# Patient Record
Sex: FEMALE | Race: ASIAN | NOT HISPANIC OR LATINO | Employment: FULL TIME | URBAN - METROPOLITAN AREA
[De-identification: names, ages, dates, MRNs, and addresses within clinical notes are randomized per-mention and may not be internally consistent; named-entity substitution may affect disease eponyms.]

---

## 2018-09-02 ENCOUNTER — OFFICE VISIT (OUTPATIENT)
Dept: URGENT CARE | Facility: CLINIC | Age: 38
End: 2018-09-02
Payer: COMMERCIAL

## 2018-09-02 VITALS
DIASTOLIC BLOOD PRESSURE: 70 MMHG | SYSTOLIC BLOOD PRESSURE: 110 MMHG | RESPIRATION RATE: 16 BRPM | HEART RATE: 90 BPM | BODY MASS INDEX: 21.33 KG/M2 | WEIGHT: 120.4 LBS | OXYGEN SATURATION: 100 % | TEMPERATURE: 98.6 F | HEIGHT: 63 IN

## 2018-09-02 DIAGNOSIS — R19.7 DIARRHEA, UNSPECIFIED TYPE: ICD-10-CM

## 2018-09-02 DIAGNOSIS — K52.9 NONINFECTIOUS GASTROENTERITIS, UNSPECIFIED TYPE: Primary | ICD-10-CM

## 2018-09-02 DIAGNOSIS — R11.2 NAUSEA AND VOMITING, INTRACTABILITY OF VOMITING NOT SPECIFIED, UNSPECIFIED VOMITING TYPE: ICD-10-CM

## 2018-09-02 PROCEDURE — 99213 OFFICE O/P EST LOW 20 MIN: CPT | Performed by: NURSE PRACTITIONER

## 2018-09-02 RX ORDER — ONDANSETRON 4 MG/1
4 TABLET, FILM COATED ORAL EVERY 8 HOURS PRN
Qty: 20 TABLET | Refills: 0 | Status: SHIPPED | OUTPATIENT
Start: 2018-09-02

## 2018-09-02 RX ORDER — LORAZEPAM 0.5 MG/1
0.5 TABLET ORAL EVERY 8 HOURS PRN
COMMUNITY

## 2018-09-02 NOTE — PATIENT INSTRUCTIONS
Stay hydrated  Fluids  Gatorade, powerade, smart water, etc    BRAT diet (bananas, rice, applesauce, tea, toast)  Zofran as needed for nausea and vomiting  If symptoms persist or worsen, follow up with your primary care provider

## 2018-09-02 NOTE — PROGRESS NOTES
Assessment/Plan:     1  Noninfectious gastroenteritis, unspecified type  Viral vs food poisoning  Fluids/electrolyte replacement  Instructed on BRAT diet once n/v controlled  - ondansetron (ZOFRAN) 4 mg tablet; Take 1 tablet (4 mg total) by mouth every 8 (eight) hours as needed for nausea or vomiting  Dispense: 20 tablet; Refill: 0    2  Nausea and vomiting, intractability of vomiting not specified, unspecified vomiting type  - ondansetron (ZOFRAN) 4 mg tablet; Take 1 tablet (4 mg total) by mouth every 8 (eight) hours as needed for nausea or vomiting  Dispense: 20 tablet; Refill: 0    3  Diarrhea, unspecified type  Maintain hydration  Clear liquids and then advance diet as tolerated, BRAT diet initially  Follow up with pcp if diarrhea persists  Subjective:     Patient ID: Mckayla Sprague is a 45 y o  female who presents for n/v/d    thinks has food poisoning  Ate a protein box from [employer]  No sick contacts  Symptoms started 2-3 days  N/v/d  Unable to keep anything down  Ate soup last night and vomited 8pm  No vomiting since  Diarrhea last episode just before arrival  Several times a day  Able to tolerate sips of water  Achey        Review of Systems   Constitutional: Positive for chills  Negative for fever  Gastrointestinal: Positive for diarrhea, nausea and vomiting  Negative for abdominal pain  Musculoskeletal: Positive for myalgias ("hurts all over")  Skin: Negative for pallor  Neurological: Negative for dizziness  Objective:     Physical Exam   Constitutional: She is oriented to person, place, and time  She appears well-developed and well-nourished  No distress  HENT:   Oral mucosa moist   Cardiovascular: Normal rate  Pulmonary/Chest: Effort normal    Abdominal: Soft  Bowel sounds are normal  She exhibits no distension  There is no tenderness  Neurological: She is alert and oriented to person, place, and time  Skin: Skin is warm and dry  No rash noted     Vitals reviewed

## 2023-09-11 ENCOUNTER — OFFICE VISIT (OUTPATIENT)
Dept: URGENT CARE | Facility: CLINIC | Age: 43
End: 2023-09-11
Payer: COMMERCIAL

## 2023-09-11 ENCOUNTER — APPOINTMENT (OUTPATIENT)
Dept: RADIOLOGY | Facility: CLINIC | Age: 43
End: 2023-09-11
Attending: FAMILY MEDICINE
Payer: COMMERCIAL

## 2023-09-11 VITALS
SYSTOLIC BLOOD PRESSURE: 114 MMHG | HEIGHT: 62 IN | RESPIRATION RATE: 18 BRPM | BODY MASS INDEX: 24.93 KG/M2 | OXYGEN SATURATION: 99 % | HEART RATE: 73 BPM | DIASTOLIC BLOOD PRESSURE: 70 MMHG | WEIGHT: 135.5 LBS | TEMPERATURE: 97.8 F

## 2023-09-11 DIAGNOSIS — R07.81 RIB PAIN ON RIGHT SIDE: ICD-10-CM

## 2023-09-11 DIAGNOSIS — S20.211A RIB CONTUSION, RIGHT, INITIAL ENCOUNTER: Primary | ICD-10-CM

## 2023-09-11 PROCEDURE — 99213 OFFICE O/P EST LOW 20 MIN: CPT | Performed by: FAMILY MEDICINE

## 2023-09-11 PROCEDURE — 71101 X-RAY EXAM UNILAT RIBS/CHEST: CPT

## 2023-09-11 RX ORDER — BUPROPION HYDROCHLORIDE 150 MG/1
TABLET ORAL
COMMUNITY
Start: 2023-08-14

## 2023-09-11 RX ORDER — NAPROXEN 500 MG/1
500 TABLET ORAL EVERY 12 HOURS PRN
Qty: 30 TABLET | Refills: 0 | Status: SHIPPED | OUTPATIENT
Start: 2023-09-11

## 2023-09-11 RX ORDER — ESCITALOPRAM OXALATE 10 MG/1
TABLET ORAL
COMMUNITY
Start: 2023-08-14

## 2023-09-13 NOTE — PATIENT INSTRUCTIONS
Xray of ribs shows no fractures. Patient is to rest and apply a heating pad to the site. Naproxen prescribed to help w/ pain, use as directed, side effects discussed, take w/ food and water, may alternate w/ Tylenol as needed. May use a muscle rub such as Bengay or Icy Hot or topical Lidoderm patches (available OTC) as needed. Follow up w/ PCP office for re-check in 3-5 days. If symptoms persist despite treatment, worsen, or any new symptoms present, patient is to be seen in the ER.

## 2023-09-13 NOTE — PROGRESS NOTES
[facility]        NAME: Dirk Singh is a 37 y.o. female  : 1980    MRN: [id number]  DATE: 2023  TIME: 4:43 PM    Assessment and Plan   Rib contusion, right, initial encounter [Z24.120W]  0. Rib contusion, right, initial encounter  XR ribs right w pa chest min 3 views    naproxen (NAPROSYN) 500 mg tablet            Patient Instructions     Patient Instructions   Xray of ribs shows no fractures. Patient is to rest and apply a heating pad to the site. Naproxen prescribed to help w/ pain, use as directed, side effects discussed, take w/ food and water, may alternate w/ Tylenol as needed. May use a muscle rub such as Bengay or Icy Hot or topical Lidoderm patches (available OTC) as needed. Follow up w/ PCP office for re-check in 3-5 days. If symptoms persist despite treatment, worsen, or any new symptoms present, patient is to be seen in the ER. Follow up with PCP in 3-5 days. Proceed to  ER if symptoms worsen. Chief Complaint     Chief Complaint   Patient presents with   • Rib Pain     Pt here for right side rib pain x 1 week. Pt used Advil. History of Present Illness       36 yo female presents c/o R sided rib pain x 1 week. She states she fell while playing soccer about 1 week ago and thinks the pain is related to that. She denies any swelling or bruising of the area. No wounds or bleeding. No neck or back pain. No chest pain or SOB. She experiences pain w/ palpation of the area, deep breathing, and twisting motions of the upper body. She has been taking Ibuprofen as needed for pain. She has no known allergies. She denies any chance of pregnancy. She is concerned she may have a rib fracture. Review of Systems   Review of Systems   Constitutional: Negative. Respiratory: Negative. Cardiovascular: Negative. Gastrointestinal: Negative. Musculoskeletal:        As noted in HPI   Skin: Negative. Allergic/Immunologic: Negative.     Neurological: Negative. Hematological: Negative. Current Medications       Current Outpatient Medications:   •  buPROPion (WELLBUTRIN XL) 150 mg 24 hr tablet, , Disp: , Rfl:   •  escitalopram (LEXAPRO) 10 mg tablet, , Disp: , Rfl:   •  LORazepam (ATIVAN) 0.5 mg tablet, Take 0.5 mg by mouth every 8 (eight) hours as needed for anxiety, Disp: , Rfl:   •  naproxen (NAPROSYN) 500 mg tablet, Take 1 tablet (500 mg total) by mouth every 12 (twelve) hours as needed for mild pain, Disp: 30 tablet, Rfl: 0    Current Allergies     Allergies as of 2023   • (No Known Allergies)            The following portions of the patient's history were reviewed and updated as appropriate: allergies, current medications, past family history, past medical history, past social history, past surgical history and problem list.     Past Medical History:   Diagnosis Date   • Anxiety    • Depression        Past Surgical History:   Procedure Laterality Date   •  SECTION     • TONSILLECTOMY         Family History   Problem Relation Age of Onset   • No Known Problems Mother    • No Known Problems Father          Medications have been verified. Objective   /70   Pulse 73   Temp 97.8 °F (36.6 °C)   Resp 18   Ht 5' 2" (1.575 m)   Wt 61.5 kg (135 lb 8 oz)   SpO2 99%   BMI 24.78 kg/m²   No LMP recorded. Physical Exam     Physical Exam  Vitals and nursing note reviewed. Constitutional:       General: She is awake. She is not in acute distress. Appearance: Normal appearance. She is well-developed and well-groomed. She is not ill-appearing, toxic-appearing or diaphoretic. Neck:      Trachea: Trachea normal.   Cardiovascular:      Rate and Rhythm: Normal rate and regular rhythm. Pulses: Normal pulses. Heart sounds: Normal heart sounds. Pulmonary:      Effort: Pulmonary effort is normal. No tachypnea, accessory muscle usage or respiratory distress. Breath sounds: Normal breath sounds and air entry. Abdominal:      General: Abdomen is flat. There is no distension. Palpations: Abdomen is soft. Tenderness: There is no abdominal tenderness. There is no right CVA tenderness or left CVA tenderness. Musculoskeletal:      Cervical back: Normal range of motion and neck supple. No edema, erythema, signs of trauma, rigidity or tenderness. No pain with movement, spinous process tenderness or muscular tenderness. Normal range of motion. Comments: Back/Ribs/Chest wall: no swelling, erythema, bruising, rashes, or wounds. Skin is appropriately warm and intact. There is mild to moderate generalized tenderness to palpation along the right sided lateral rib region and the right anterior ribs cage. No step offs palpated. No spinal or paraspinal tenderness. Patient experiences discomfort w/ twisting motions of the upper body. Skin:     General: Skin is warm and dry. Capillary Refill: Capillary refill takes less than 2 seconds. Coloration: Skin is not pale. Findings: No abrasion, bruising, ecchymosis, erythema, rash or wound. Neurological:      Mental Status: She is alert and oriented to person, place, and time. Mental status is at baseline. Psychiatric:         Mood and Affect: Mood normal.         Behavior: Behavior normal. Behavior is cooperative. Thought Content:  Thought content normal.         Judgment: Judgment normal.

## 2025-02-13 ENCOUNTER — OFFICE VISIT (OUTPATIENT)
Dept: URGENT CARE | Facility: CLINIC | Age: 45
End: 2025-02-13
Payer: COMMERCIAL

## 2025-02-13 VITALS
SYSTOLIC BLOOD PRESSURE: 122 MMHG | WEIGHT: 140.8 LBS | HEART RATE: 72 BPM | RESPIRATION RATE: 20 BRPM | BODY MASS INDEX: 25.75 KG/M2 | TEMPERATURE: 98.1 F | DIASTOLIC BLOOD PRESSURE: 78 MMHG | OXYGEN SATURATION: 100 %

## 2025-02-13 DIAGNOSIS — R05.1 ACUTE COUGH: Primary | ICD-10-CM

## 2025-02-13 PROCEDURE — 99213 OFFICE O/P EST LOW 20 MIN: CPT | Performed by: PHYSICIAN ASSISTANT

## 2025-02-13 RX ORDER — ALBUTEROL SULFATE 90 UG/1
2 INHALANT RESPIRATORY (INHALATION) EVERY 6 HOURS PRN
Qty: 6.7 G | Refills: 0 | Status: SHIPPED | OUTPATIENT
Start: 2025-02-13

## 2025-02-13 RX ORDER — ONDANSETRON 4 MG/1
TABLET, FILM COATED ORAL
COMMUNITY
Start: 2024-12-04

## 2025-02-13 RX ORDER — DEXTROAMPHETAMINE SACCHARATE, AMPHETAMINE ASPARTATE, DEXTROAMPHETAMINE SULFATE AND AMPHETAMINE SULFATE 2.5; 2.5; 2.5; 2.5 MG/1; MG/1; MG/1; MG/1
TABLET ORAL
COMMUNITY
Start: 2025-01-27

## 2025-02-13 RX ORDER — BENZONATATE 100 MG/1
100 CAPSULE ORAL 3 TIMES DAILY PRN
Qty: 20 CAPSULE | Refills: 0 | Status: SHIPPED | OUTPATIENT
Start: 2025-02-13

## 2025-02-13 RX ORDER — OSELTAMIVIR PHOSPHATE 75 MG/1
CAPSULE ORAL
COMMUNITY
Start: 2025-02-10

## 2025-02-13 NOTE — PROGRESS NOTES
[facility]        NAME: Emma Barajas is a 44 y.o. female  : 1980    MRN: [id number]  DATE: 2025  TIME: 10:35 AM    Assessment and Plan   Acute cough [R05.1]  1. Acute cough  benzonatate (TESSALON PERLES) 100 mg capsule    albuterol (Proventil HFA) 90 mcg/act inhaler            Patient Instructions   Acute Upper Respiratory Tract Infection: Will send in tessalon perles and albuterol inhaler to be used as needed. We discussed likely influenza based on clinical hx.   -Based on the patients hx and clinical presentation they are likely suffering from a Viral illness. No sign of bacterial infection at this time.   -Stay very well hydrated and push fluids, gatorade, pedialyte or electrolyte drinks can be beneficial. Staying hydrated is very important.   -Run a humidifier by your bed and take steamy showers to clear mucus   -Zicam nasal AllClear can be soothing to the nasal passages   -You can use flonase once daily for two weeks   -Advil or Tylenol for fever or pain.  -Diffusing aromatherapy oils, such as peppermint and eucalyptus can be soothing and help with congestion.   -Mucinex OTC or Zyrtec or Claritin. Drink plenty of water with the mucinex.   -Honey or throat lozenges for cough may be helpful  -Warm salt water gargles and tea with honey. Warm soups and teas can be soothing.   -Sleep with a few pillows propping you up at night to aid with coughing and congestion.   -Obtain a pulse ox device and check your O2 1-2 times a day. You want your oxygen levels to be >93%. If they go below 93% go to the ED immediately.   -Vitamin C 500mg, Vitamin D 2000IU daily. You can attempt to use zinc or Zicam up to 30mg per day.  Echinacea and elderberry may also aid with fighting viral infections.   -If your sx worsen or persist follow up with your PCP for recheck. Red flag signs and ED precautions discussed.         Follow up with PCP in 3-5 days.  Proceed to  ER if symptoms worsen.    If tests have  been performed at [facility], our office will contact you with results if changes need to be made to the care plan discussed with you at the visit.  You can review your full results on [facility].    Chief Complaint     Chief Complaint   Patient presents with    Cold Like Symptoms     Pt here ill x 4 days s/s cough, fever 100.2, tired, body aches, congestion, sore throat, diarrhea.  Pt used Dayquil and Nyquil. Home Covid /Flu A and B neg . Pt had Tamiflu  which she did not use.  Pt was seen by her PCP  Tamiflu was given  which she did not use.          History of Present Illness       The patient is a 44-year-old female who presents today for a four day hx cough, fever, fatigue, myalgias, congestion, sore throat and loose stools. The patient has been taking Nyquil and Dayquil for her sx. She states that she was seen by her PCP at the onset of her sx who prescribed her tamiflu which she did not take. The following day she took a home COVID/Flu test which was negative.  She states that she was exposed to influenza while at work. She states that today she has more energy and feels mildly improved. No fever, chills, body aches today. No dyspnea, wheezing, chest tightness, cp, palpitations. No dizziness or weakness. No nausea, vomiting, constipation, abdominal pain.   No stridor or drooling. No rash. No sweats or diaphoresis. No muffled voice.  Good PO intake. No loss of taste or smell. No lower extremity edema. No hx of asthma or smoking.        Review of Systems   Review of Systems   Constitutional:  Positive for chills, fatigue and fever. Negative for activity change, appetite change and diaphoresis.   HENT:  Positive for congestion and sore throat. Negative for ear discharge, ear pain, facial swelling, hearing loss, postnasal drip, rhinorrhea, sinus pressure, sinus pain, tinnitus, trouble swallowing and voice change.    Eyes:  Negative for visual disturbance.   Respiratory:  Positive for cough. Negative for  apnea, chest tightness, shortness of breath, wheezing and stridor.    Cardiovascular:  Negative for chest pain, palpitations and leg swelling.   Gastrointestinal:  Positive for diarrhea. Negative for abdominal distention, abdominal pain, nausea and vomiting.   Genitourinary:  Negative for decreased urine volume.   Musculoskeletal:  Negative for arthralgias, joint swelling, myalgias, neck pain and neck stiffness.   Skin:  Negative for rash.   Allergic/Immunologic: Negative for immunocompromised state.   Neurological:  Positive for headaches. Negative for dizziness, weakness, light-headedness and numbness.   Hematological:  Negative for adenopathy.         Current Medications       Current Outpatient Medications:     albuterol (Proventil HFA) 90 mcg/act inhaler, Inhale 2 puffs every 6 (six) hours as needed for wheezing or shortness of breath, Disp: 6.7 g, Rfl: 0    amphetamine-dextroamphetamine (ADDERALL) 10 mg tablet, , Disp: , Rfl:     benzonatate (TESSALON PERLES) 100 mg capsule, Take 1 capsule (100 mg total) by mouth 3 (three) times a day as needed for cough, Disp: 20 capsule, Rfl: 0    buPROPion (WELLBUTRIN XL) 150 mg 24 hr tablet, , Disp: , Rfl:     escitalopram (LEXAPRO) 10 mg tablet, , Disp: , Rfl:     LORazepam (ATIVAN) 0.5 mg tablet, Take 0.5 mg by mouth every 8 (eight) hours as needed for anxiety, Disp: , Rfl:     naproxen (NAPROSYN) 500 mg tablet, Take 1 tablet (500 mg total) by mouth every 12 (twelve) hours as needed for mild pain, Disp: 30 tablet, Rfl: 0    ondansetron (ZOFRAN) 4 mg tablet, , Disp: , Rfl:     oseltamivir (TAMIFLU) 75 mg capsule, , Disp: , Rfl:     Current Allergies     Allergies as of 02/13/2025    (No Known Allergies)            The following portions of the patient's history were reviewed and updated as appropriate: allergies, current medications, past family history, past medical history, past social history, past surgical history and problem list.     Past Medical History:    Diagnosis Date    ADHD (attention deficit hyperactivity disorder)     Anxiety     Depression        Past Surgical History:   Procedure Laterality Date     SECTION      TONSILLECTOMY         Family History   Problem Relation Age of Onset    No Known Problems Mother     Hypertension Father     Diabetes Father          Medications have been verified.        Objective   /78 (Patient Position: Sitting, Cuff Size: Standard)   Pulse 72   Temp 98.1 °F (36.7 °C) (Tympanic)   Resp 20   Wt 63.9 kg (140 lb 12.8 oz)   SpO2 100%   BMI 25.75 kg/m²   No LMP recorded.       Physical Exam     Physical Exam  Vitals and nursing note reviewed.   Constitutional:       General: She is not in acute distress.     Appearance: She is well-developed. She is not ill-appearing, toxic-appearing or diaphoretic.   HENT:      Head: Normocephalic and atraumatic.      Right Ear: Hearing, tympanic membrane, ear canal and external ear normal.      Left Ear: Hearing, tympanic membrane, ear canal and external ear normal.      Nose: Nose normal. No mucosal edema or rhinorrhea.      Right Sinus: No maxillary sinus tenderness or frontal sinus tenderness.      Left Sinus: No maxillary sinus tenderness or frontal sinus tenderness.      Mouth/Throat:      Pharynx: Uvula midline. No oropharyngeal exudate, posterior oropharyngeal erythema or uvula swelling.      Tonsils: No tonsillar abscesses.   Cardiovascular:      Rate and Rhythm: Normal rate and regular rhythm.      Heart sounds: S1 normal and S2 normal. Heart sounds not distant. No murmur heard.     No friction rub. No gallop. No S3 or S4 sounds.   Pulmonary:      Effort: No tachypnea, bradypnea, accessory muscle usage or respiratory distress.      Breath sounds: Normal air entry. Transmitted upper airway sounds present. No stridor or decreased air movement. No decreased breath sounds, wheezing, rhonchi or rales.   Musculoskeletal:      Cervical back: Normal range of motion and neck  supple.   Lymphadenopathy:      Cervical: No cervical adenopathy.   Neurological:      Mental Status: She is alert and oriented to person, place, and time.   Psychiatric:         Behavior: Behavior normal.

## 2025-02-13 NOTE — PATIENT INSTRUCTIONS
Acute Upper Respiratory Tract Infection: Will send in tessalon perles and albuterol inhaler to be used as needed. We discussed likely influenza based on clinical hx.   -Based on the patients hx and clinical presentation they are likely suffering from a Viral illness. No sign of bacterial infection at this time.   -Stay very well hydrated and push fluids, gatorade, pedialyte or electrolyte drinks can be beneficial. Staying hydrated is very important.   -Run a humidifier by your bed and take steamy showers to clear mucus   -Zicam nasal AllClear can be soothing to the nasal passages   -You can use flonase once daily for two weeks   -Advil or Tylenol for fever or pain.  -Diffusing aromatherapy oils, such as peppermint and eucalyptus can be soothing and help with congestion.   -Mucinex OTC or Zyrtec or Claritin. Drink plenty of water with the mucinex.   -Honey or throat lozenges for cough may be helpful  -Warm salt water gargles and tea with honey. Warm soups and teas can be soothing.   -Sleep with a few pillows propping you up at night to aid with coughing and congestion.   -Obtain a pulse ox device and check your O2 1-2 times a day. You want your oxygen levels to be >93%. If they go below 93% go to the ED immediately.   -Vitamin C 500mg, Vitamin D 2000IU daily. You can attempt to use zinc or Zicam up to 30mg per day.  Echinacea and elderberry may also aid with fighting viral infections.   -If your sx worsen or persist follow up with your PCP for recheck. Red flag signs and ED precautions discussed.

## 2025-03-02 ENCOUNTER — APPOINTMENT (OUTPATIENT)
Dept: RADIOLOGY | Facility: CLINIC | Age: 45
End: 2025-03-02
Payer: COMMERCIAL

## 2025-03-02 ENCOUNTER — OFFICE VISIT (OUTPATIENT)
Dept: URGENT CARE | Facility: CLINIC | Age: 45
End: 2025-03-02
Payer: COMMERCIAL

## 2025-03-02 VITALS
HEART RATE: 70 BPM | RESPIRATION RATE: 20 BRPM | DIASTOLIC BLOOD PRESSURE: 100 MMHG | SYSTOLIC BLOOD PRESSURE: 148 MMHG | TEMPERATURE: 97.3 F | BODY MASS INDEX: 25.9 KG/M2 | OXYGEN SATURATION: 97 % | WEIGHT: 141.6 LBS

## 2025-03-02 DIAGNOSIS — R07.81 RIB PAIN: Primary | ICD-10-CM

## 2025-03-02 DIAGNOSIS — R07.81 RIB PAIN: ICD-10-CM

## 2025-03-02 PROCEDURE — 99213 OFFICE O/P EST LOW 20 MIN: CPT | Performed by: PHYSICIAN ASSISTANT

## 2025-03-02 PROCEDURE — 71111 X-RAY EXAM RIBS/CHEST4/> VWS: CPT

## 2025-03-02 RX ORDER — NAPROXEN 500 MG/1
500 TABLET ORAL 2 TIMES DAILY WITH MEALS
Qty: 30 TABLET | Refills: 0 | Status: SHIPPED | OUTPATIENT
Start: 2025-03-02

## 2025-03-02 NOTE — PROGRESS NOTES
"  [facility]        NAME: Emma Barajas is a 44 y.o. female  : 1980    MRN: [id number]  DATE: 2025  TIME: 11:15 AM    Assessment and Plan   Rib pain [R07.81]  1. Rib pain  XR ribs left w pa chest min 3 views    XR ribs 2 vw right            Patient Instructions   Bilateral rib pain  X-rays negative  Rx naproxen for pain sent via EMR  Do not use with other ibuprofen products can take Tylenol for breakthrough pain  Avoid prolonged use of the binder  Rest and ice to the area as needed    Follow up with PCP in 3-5 days.  Proceed to  ER if symptoms worsen.    If tests have been performed at [facility], our office will contact you with results if changes need to be made to the care plan discussed with you at the visit.  You can review your full results on [facility] MyChart.    Chief Complaint     Chief Complaint   Patient presents with    Rib Pain     Reports B/L rib pain, worse on right today after having flu-like symptoms with coughing \"a few weeks ago.\" States she feels like it is \"bruised\" and pain is worse with palpation and coughing. Having difficulty stretching and reaching due to pain. Used Advil and Tramadol that has been ineffective. Reports hx of right rib fracture about 2 years ago.          History of Present Illness       Emma is a 44-year-old female who presents to clinic complaining of bilateral rib pain x 1 week.  She states he had a viral upper respiratory infection with coughing and notes she had mild rib pain but states that this upper respiratory infection symptoms have resolved with the cough still remains and the rib pain is worsening.  She also notes tenderness and increased pain with bending and twisting and deep breaths.  Denies any shortness of breath, bruising, or swelling.        Review of Systems   Review of Systems   Constitutional: Negative.    Respiratory:  Positive for cough. Negative for shortness of breath.    Cardiovascular:  Positive for chest pain (bilateral " rib pain at axillary line).         Current Medications       Current Outpatient Medications:     albuterol (Proventil HFA) 90 mcg/act inhaler, Inhale 2 puffs every 6 (six) hours as needed for wheezing or shortness of breath, Disp: 6.7 g, Rfl: 0    amphetamine-dextroamphetamine (ADDERALL) 10 mg tablet, , Disp: , Rfl:     benzonatate (TESSALON PERLES) 100 mg capsule, Take 1 capsule (100 mg total) by mouth 3 (three) times a day as needed for cough, Disp: 20 capsule, Rfl: 0    escitalopram (LEXAPRO) 10 mg tablet, , Disp: , Rfl:     LORazepam (ATIVAN) 0.5 mg tablet, Take 0.5 mg by mouth every 8 (eight) hours as needed for anxiety, Disp: , Rfl:     naproxen (NAPROSYN) 500 mg tablet, Take 1 tablet (500 mg total) by mouth every 12 (twelve) hours as needed for mild pain, Disp: 30 tablet, Rfl: 0    ondansetron (ZOFRAN) 4 mg tablet, , Disp: , Rfl:     buPROPion (WELLBUTRIN XL) 150 mg 24 hr tablet, , Disp: , Rfl:     oseltamivir (TAMIFLU) 75 mg capsule, , Disp: , Rfl:     Current Allergies     Allergies as of 2025    (No Known Allergies)            The following portions of the patient's history were reviewed and updated as appropriate: allergies, current medications, past family history, past medical history, past social history, past surgical history and problem list.     Past Medical History:   Diagnosis Date    ADHD (attention deficit hyperactivity disorder)     Anxiety     Depression        Past Surgical History:   Procedure Laterality Date     SECTION      TONSILLECTOMY         Family History   Problem Relation Age of Onset    No Known Problems Mother     Hypertension Father     Diabetes Father          Medications have been verified.        Objective   /100 Comment: manual, left arm  Pulse 70   Temp (!) 97.3 °F (36.3 °C) (Tympanic)   Resp 20   Wt 64.2 kg (141 lb 9.6 oz)   LMP 02/10/2025 (Approximate)   SpO2 97%   BMI 25.90 kg/m²   Patient's last menstrual period was 02/10/2025  (approximate).       Physical Exam     Physical Exam  Vitals and nursing note reviewed.   Constitutional:       General: She is not in acute distress.     Appearance: Normal appearance. She is not ill-appearing.   Cardiovascular:      Rate and Rhythm: Normal rate and regular rhythm.      Heart sounds: Normal heart sounds.   Pulmonary:      Effort: Pulmonary effort is normal. No respiratory distress.      Breath sounds: Normal breath sounds. No stridor. No wheezing, rhonchi or rales.   Chest:      Chest wall: Tenderness present. No swelling, crepitus or edema.       Neurological:      Mental Status: She is alert and oriented to person, place, and time.   Psychiatric:         Mood and Affect: Mood normal.         Behavior: Behavior normal.